# Patient Record
Sex: FEMALE | Race: WHITE | Employment: OTHER | URBAN - METROPOLITAN AREA
[De-identification: names, ages, dates, MRNs, and addresses within clinical notes are randomized per-mention and may not be internally consistent; named-entity substitution may affect disease eponyms.]

---

## 2017-03-20 ENCOUNTER — HOSPITAL ENCOUNTER (OUTPATIENT)
Dept: CT IMAGING | Age: 67
Discharge: HOME | End: 2017-03-20

## 2017-03-20 DIAGNOSIS — H47.20 OPTIC ATROPHY: ICD-10-CM

## 2017-03-20 PROCEDURE — 70450 CT HEAD/BRAIN W/O DYE: CPT

## 2017-05-08 ENCOUNTER — OFFICE VISIT (OUTPATIENT)
Dept: HEMATOLOGY | Age: 67
End: 2017-05-08

## 2017-05-08 VITALS
TEMPERATURE: 97 F | HEIGHT: 62 IN | HEART RATE: 69 BPM | BODY MASS INDEX: 24.29 KG/M2 | SYSTOLIC BLOOD PRESSURE: 138 MMHG | OXYGEN SATURATION: 98 % | DIASTOLIC BLOOD PRESSURE: 81 MMHG | WEIGHT: 132 LBS

## 2017-05-08 DIAGNOSIS — R74.8 ELEVATED LIVER ENZYMES: Primary | ICD-10-CM

## 2017-05-08 RX ORDER — BRIMONIDINE TARTRATE, TIMOLOL MALEATE 2; 5 MG/ML; MG/ML
1 SOLUTION/ DROPS OPHTHALMIC EVERY 12 HOURS
COMMUNITY

## 2017-05-08 NOTE — PROGRESS NOTES
93 Sangeetha Cantu MD, MICHELLE Karimi PA-C Iantha Hooker, MD, MD Shauna Farias NP Soundra Barbara, NP        [address], [address] Radha Mcgowan  22.     [phone]     FAX: 070 [facility], [address] [place] [address], [address]     [phone]     FAX: [phone]       Patient Care Team:  Nancy Rodriguez MD as PCP - General (Family Practice)  Viri Birch MD (Obstetrics & Gynecology)  Bart Robertson MD (Breast Surgery)      Problem List  Date Reviewed: 11/29/2016          Codes Class Noted    Glaucoma ICD-10-CM: H40.9  ICD-9-CM: 365.9  8/9/2016        S/P appendectomy ICD-10-CM: Z90.49  ICD-9-CM: V45.89  8/9/2016        S/P cataract extraction ICD-10-CM: Z98.49  ICD-9-CM: V45.61  8/9/2016        Family hx-breast malignancy ICD-10-CM: Z80.3  ICD-9-CM: V16.3  12/4/2014    Overview Signed 12/4/2014 11:16 AM by Bart Robertson MD     Mother and sister are BRCA neg  Pt and father have BRCA 2 mutation of unknown signficance                     Shanta Bradley returns to the [address] for management of elevated liver transaminases and elevated alkaline phosphatase. The active problem list, all pertinent past medical history, medications, radiologic findings and laboratory findings related to the liver disorder were reviewed with the patient. The patient is a 61 y.o.  female who had been feeling poorly and came to [facility] for evaluation in 8/2016. She was found to have elevation in liver transaminases and alkaline phosphate. Serologic evaluation for markers of chronic liver disease was positive for ASMA. MRI of the liver was performed in 8/2016. The results of the imaging suggested a normal appearing liver.   Bile ducts were normal.    A liver biopsy has not been performed. Fibroscan was 6.2 kPa consistent with none-mild  Fibrosis. At the last office visit in 9/2016 the liver enzymes had returned to the normal range. The patient had repeat laboratory studies a few weeks ago. The liver transaminases were still now normal, The ALP was still normal.       The patient has no symptoms which can be attributed to the liver disorder. The patient has no symptoms which can be attributed to the liver disorder. The patient completes all daily activities without any functional limitations. The patient has not experienced fatigue, fevers, chills, arthralgias, myalgias,         ALLERGIES  Allergies   Allergen Reactions    Ciprofloxacin Nausea and Vomiting    Other Medication Other (comments)     \"flouress eye drops\"  Eyes get red    Other Plant, Animal, Environmental Unknown (comments)     \"flowers\"    Tropicamide Other (comments)     \"eyes get very red\"    \"tropicamide eye drops\"    Venlafaxine Other (comments)     \"depression\"       MEDICATIONS  Current Outpatient Prescriptions   Medication Sig    brimonidine-timolol (COMBIGAN) 0.2-0.5 % drop ophthalmic solution 1 Drop every twelve (12) hours.  irbesartan (AVAPRO) 150 mg tablet Take 150 mg by mouth nightly.  traMADol (ULTRAM) 50 mg tablet Take 50 mg by mouth every six (6) hours as needed for Pain.  pramoxine-hydrocortisone (PROTOFOAM-HC) 2.5-1 % topical cream Apply  to affected area three (3) times daily.  ASPIRIN/ACETAMINOPHEN/CAFFEINE (EXCEDRIN MIGRAINE PO) Take  by mouth.  TRIAMCINOLONE (ARISTOCORT PO) Take  by mouth.  bimatoprost (LUMIGAN) 0.03 % ophthalmic drops Administer 1 drop to both eyes every evening.  MULTIVIT,CA,MIN/D3/HERBAL #161 (ESTROVEN PM PO) Take  by mouth.  iron-vitamin C (VITRON-C) 65 mg iron- 125 mg TbEC Take  by mouth.  CYANOCOBALAMIN, VITAMIN B-12, (VITAMIN B-12 PO) Take  by mouth.     calcium carbonate (TUMS) 200 mg calcium (500 mg) chew Take 1 tablet by mouth daily.  timolol (TIMOPTIC) 0.5 % ophthalmic solution 1 drop two (2) times a day. No current facility-administered medications for this visit. SYSTEM REVIEW NOT RELATED TO LIVER DISEASE OR REVIEWED ABOVE:  Constitution systems: Negative for fever, chills, weight gain, weight loss. Eyes: Negative for visual changes. ENT: Negative for sore throat, painful swallowing. Respiratory: Negative for cough, hemoptysis, SOB. Cardiology: Negative for chest pain, palpitations. GI:  Negative for constipation or diarrhea. : Negative for urinary frequency, dysuria, hematuria, nocturia. Skin: Negative for rash. Hematology: Negative for easy bruising, blood clots. Musculo-skelatal: Negative for back pain, muscle pain, weakness. Neurologic: Negative for headaches, dizziness, vertigo, memory problems not related to HE. Psychology: Negative for anxiety, depression. FAMILY HISTORY:  The father is alive and healthy. The mother  of CVA. There is no family history of liver disease. There is no family history of immune disorders. SOCIAL HISTORY:  The patient is . The patient has 3 children, and 2 grandchildren. The patient has never used tobacco products. The patient has never consumed significant amounts of alcohol. The patient used to work RN. The patient has not worked since 48s. PHYSICAL EXAMINATION:  /81  Pulse 69  Temp 97 °F (36.1 °C) (Tympanic)   Ht 5' 2\" (1.575 m)  Wt 132 lb (59.9 kg)  LMP  (LMP Unknown)  SpO2 98%  BMI 24.14 kg/m2  General: No acute distress. Eyes: Sclera anicteric. ENT: No oral lesions. Thyroid normal.  Nodes: No adenopathy. Skin: No spider angiomata. No jaundice. No palmar erythema. Respiratory: Lungs clear to auscultation. Cardiovascular: Regular heart rate. No murmurs. No JVD. Abdomen: Soft non-tender. Liver size normal to percussion/palpation. Spleen not palpable.  No obvious ascites. Extremities: No edema. No muscle wasting. No gross arthritic changes. Neurologic: Alert and oriented. Cranial nerves grossly intact. No asterixis. LABORATORY STUDIES:  Liver [facility] Ref Rng 9/19/2016 8/15/2016 8/9/2016   WBC 3.4 - 10.8 x10E3/uL  8.7 10.3   ANC 1.4 - 7.0 x10E3/uL   5.3   HGB 11.1 - 15.9 g/dL  10.4 (L) 10.2 (L)    - 379 x10E3/uL  499 (H) 383 (H)   INR 0.8 - 1.2  0.9    AST 0 - 40 IU/L 26 52 (H) 81 (H)   ALT 0 - 32 IU/L 27 101 (H) 153 (H)   Alk Phos 39 - 117 IU/L 94 535 (H) 777 (H)   Bili, Total 0.0 - 1.2 mg/dL 0.2 0.4 1.5 (H)   Bili, Direct 0.00 - 0.40 mg/dL 0.09  1.18 (H)   Albumin 3.6 - 4.8 g/dL 4.2 3.6 3.1 (L)   BUN 8 - 27 mg/dL  11 11   Creat 0.57 - 1.00 mg/dL  0.53 (L) 0.73   Na 134 - 144 mmol/L  145 (H) 140   K 3.5 - 5.2 mmol/L  4.3 3.8   Cl 97 - 108 mmol/L  103 103   CO2 18 - 29 mmol/L  22 21   Glucose 65 - 99 mg/dL  103 (H) 132 (H)     From 4/2017  AST/ALT/ALP/T Bili/ALB:  24/30/55/0.2/3.9    SEROLOGIES:  Serologies Latest Ref Rng 8/9/2016   Hep A Ab, Total Negative Positive (A)   Hep B Surface Ag Negative Negative   Hep B Core Ab, Total Negative Negative   Hep B Surface AB QL  Non Reactive   Hep C Ab 0.0 - 0.9 s/co ratio <0.1   Ferritin 15 - 150 ng/mL 227 (H)   Iron % Saturation 15 - 55 % 26   KIRAN, IFA  Negative   C-ANCA Neg:<1:20 titer <1:20   P-ANCA Neg:<1:20 titer <1:20   ANCA Neg:<1:20 titer <1:20   ASMCA 0 - 19 Units 22 (H)   M2 Ab 0.0 - 20.0 Units 9.9     LIVER HISTOLOGY:  8/2016. FibroScan performed at [address]. EkPa was 6.2. IQR/med 8%. The results suggested a fibrosis level of F0-1. ENDOSCOPIC PROCEDURES:  Not available or performed    RADIOLOGY:  8/2016. Ultrasound of liver. Normal appearing liver. No liver mass lesions. 8/2016. Dynamic MRI of liver. Normal appearing liver. No liver mass lesions. Normal spleen. No dilated bile ducts. No bile duct strictures. No ascites.     OTHER TESTING:  Not available or performed    ASSESSMENT AND PLAN:  Acute elevation in liver transaminases and alkaline phosphate which have returned to normal and remain normal.    Positive ASMA is probably of no clinical significance. With persistent normal liver enzymes there is no evidence she has an immune liver disorder. The elevation in liver enzymes was probably secondary to a non-specific viral syndrome which has resolved. Liver function is normal.  The platelet count is normal.      Based upon laboratory studies, Fibroscan and imaging the patient does not appear to have any evidence of chronic liver disease. The patient was directed to continue all current medications at the current dosages. There are no contraindications for the patient to take any medications that are necessary for treatment of other medical issues. The patient was counseled regarding alcohol consumption. Vaccination for viral hepatitis A is not needed. The patient has serologic evidence of prior exposure or vaccination with immunity. All of the above issues were discussed with the patient. All questions were answered. The patient expressed a clear understanding of the above. [facility] at [facility] is needed. I would be glad to see the patient back for follow-up at any time in the future if the clinical situation changes.     Piedad Abbott MD  Liver [place] [address] [facility] [address] [address]  [address] [facility] 22. [phone]

## 2019-06-14 ENCOUNTER — HOSPITAL ENCOUNTER (OUTPATIENT)
Dept: ULTRASOUND IMAGING | Age: 69
Discharge: HOME | End: 2019-06-14
Attending: PHYSICIAN ASSISTANT
Payer: COMMERCIAL

## 2019-06-14 DIAGNOSIS — M79.89 NODULE OF SOFT TISSUE: ICD-10-CM

## 2019-06-14 PROCEDURE — 76882 US LMTD JT/FCL EVL NVASC XTR: CPT
